# Patient Record
Sex: MALE | Race: WHITE | NOT HISPANIC OR LATINO | URBAN - METROPOLITAN AREA
[De-identification: names, ages, dates, MRNs, and addresses within clinical notes are randomized per-mention and may not be internally consistent; named-entity substitution may affect disease eponyms.]

---

## 2017-09-02 ENCOUNTER — HOSPITAL ENCOUNTER (OUTPATIENT)
Dept: CT IMAGING | Facility: CLINIC | Age: 57
Discharge: HOME | End: 2017-09-02

## 2017-09-02 DIAGNOSIS — R10.9 LEFT SIDED ABDOMINAL PAIN: ICD-10-CM

## 2017-09-05 ENCOUNTER — COMMUNICATION - HEALTHEAST (OUTPATIENT)
Dept: UROLOGY | Facility: CLINIC | Age: 57
End: 2017-09-05

## 2017-09-11 ENCOUNTER — COMMUNICATION - HEALTHEAST (OUTPATIENT)
Dept: NEUROSURGERY | Facility: CLINIC | Age: 57
End: 2017-09-11

## 2017-09-14 ENCOUNTER — RECORDS - HEALTHEAST (OUTPATIENT)
Dept: ADMINISTRATIVE | Facility: OTHER | Age: 57
End: 2017-09-14

## 2017-09-15 ENCOUNTER — OFFICE VISIT - HEALTHEAST (OUTPATIENT)
Dept: NEUROSURGERY | Facility: CLINIC | Age: 57
End: 2017-09-15

## 2017-09-15 DIAGNOSIS — G95.20 CORD COMPRESSION MYELOPATHY (H): ICD-10-CM

## 2017-09-15 ASSESSMENT — MIFFLIN-ST. JEOR: SCORE: 1722.98

## 2017-09-18 ENCOUNTER — COMMUNICATION - HEALTHEAST (OUTPATIENT)
Dept: NEUROSURGERY | Facility: CLINIC | Age: 57
End: 2017-09-18

## 2017-09-20 ENCOUNTER — RECORDS - HEALTHEAST (OUTPATIENT)
Dept: ADMINISTRATIVE | Facility: OTHER | Age: 57
End: 2017-09-20

## 2017-12-08 ENCOUNTER — COMMUNICATION - HEALTHEAST (OUTPATIENT)
Dept: NEUROSURGERY | Facility: CLINIC | Age: 57
End: 2017-12-08

## 2017-12-12 ENCOUNTER — RECORDS - HEALTHEAST (OUTPATIENT)
Dept: ADMINISTRATIVE | Facility: OTHER | Age: 57
End: 2017-12-12

## 2018-01-16 ENCOUNTER — RECORDS - HEALTHEAST (OUTPATIENT)
Dept: ADMINISTRATIVE | Facility: OTHER | Age: 58
End: 2018-01-16

## 2018-01-17 ENCOUNTER — OFFICE VISIT - HEALTHEAST (OUTPATIENT)
Dept: NEUROSURGERY | Facility: CLINIC | Age: 58
End: 2018-01-17

## 2018-01-17 DIAGNOSIS — G95.20 CORD COMPRESSION (H): ICD-10-CM

## 2018-01-22 ENCOUNTER — ANESTHESIA - HEALTHEAST (OUTPATIENT)
Dept: SURGERY | Facility: CLINIC | Age: 58
End: 2018-01-22

## 2018-01-22 ENCOUNTER — SURGERY - HEALTHEAST (OUTPATIENT)
Dept: SURGERY | Facility: CLINIC | Age: 58
End: 2018-01-22

## 2018-01-22 ASSESSMENT — MIFFLIN-ST. JEOR: SCORE: 1750.2

## 2018-01-24 ENCOUNTER — COMMUNICATION - HEALTHEAST (OUTPATIENT)
Dept: NEUROSURGERY | Facility: CLINIC | Age: 58
End: 2018-01-24

## 2018-01-24 ENCOUNTER — AMBULATORY - HEALTHEAST (OUTPATIENT)
Dept: OTHER | Facility: CLINIC | Age: 58
End: 2018-01-24

## 2018-01-24 DIAGNOSIS — G95.20 CORD COMPRESSION MYELOPATHY (H): ICD-10-CM

## 2018-02-07 ENCOUNTER — COMMUNICATION - HEALTHEAST (OUTPATIENT)
Dept: NEUROSURGERY | Facility: CLINIC | Age: 58
End: 2018-02-07

## 2018-02-07 ENCOUNTER — AMBULATORY - HEALTHEAST (OUTPATIENT)
Dept: NEUROSURGERY | Facility: CLINIC | Age: 58
End: 2018-02-07

## 2018-02-15 ENCOUNTER — COMMUNICATION - HEALTHEAST (OUTPATIENT)
Dept: NEUROSURGERY | Facility: CLINIC | Age: 58
End: 2018-02-15

## 2018-02-15 DIAGNOSIS — M54.2 NECK PAIN: ICD-10-CM

## 2018-02-23 ENCOUNTER — OFFICE VISIT - HEALTHEAST (OUTPATIENT)
Dept: NEUROSURGERY | Facility: CLINIC | Age: 58
End: 2018-02-23

## 2018-02-23 ENCOUNTER — HOSPITAL ENCOUNTER (OUTPATIENT)
Dept: RADIOLOGY | Facility: CLINIC | Age: 58
Discharge: HOME | End: 2018-02-23
Attending: NEUROLOGICAL SURGERY

## 2018-02-23 ENCOUNTER — COMMUNICATION - HEALTHEAST (OUTPATIENT)
Dept: NEUROSURGERY | Facility: CLINIC | Age: 58
End: 2018-02-23

## 2018-02-23 DIAGNOSIS — G95.20 CORD COMPRESSION MYELOPATHY (H): ICD-10-CM

## 2018-02-23 DIAGNOSIS — M54.2 NECK PAIN: ICD-10-CM

## 2018-02-23 DIAGNOSIS — Z09 FOLLOW UP: ICD-10-CM

## 2018-02-28 ENCOUNTER — COMMUNICATION - HEALTHEAST (OUTPATIENT)
Dept: NEUROSURGERY | Facility: CLINIC | Age: 58
End: 2018-02-28

## 2018-02-28 DIAGNOSIS — M54.2 NECK PAIN: ICD-10-CM

## 2018-03-07 ENCOUNTER — COMMUNICATION - HEALTHEAST (OUTPATIENT)
Dept: NEUROSURGERY | Facility: CLINIC | Age: 58
End: 2018-03-07

## 2019-04-30 ENCOUNTER — RECORDS - HEALTHEAST (OUTPATIENT)
Dept: LAB | Facility: CLINIC | Age: 59
End: 2019-04-30

## 2019-04-30 LAB
ALBUMIN SERPL-MCNC: 3.9 G/DL (ref 3.5–5)
ALP SERPL-CCNC: 62 U/L (ref 45–120)
ALT SERPL W P-5'-P-CCNC: 30 U/L (ref 0–45)
ANION GAP SERPL CALCULATED.3IONS-SCNC: 12 MMOL/L (ref 5–18)
AST SERPL W P-5'-P-CCNC: 14 U/L (ref 0–40)
BILIRUB SERPL-MCNC: 0.4 MG/DL (ref 0–1)
BUN SERPL-MCNC: 18 MG/DL (ref 8–22)
CALCIUM SERPL-MCNC: 9.4 MG/DL (ref 8.5–10.5)
CHLORIDE BLD-SCNC: 109 MMOL/L (ref 98–107)
CHOLEST SERPL-MCNC: 156 MG/DL
CO2 SERPL-SCNC: 20 MMOL/L (ref 22–31)
CREAT SERPL-MCNC: 0.93 MG/DL (ref 0.7–1.3)
FASTING STATUS PATIENT QL REPORTED: ABNORMAL
GFR SERPL CREATININE-BSD FRML MDRD: >60 ML/MIN/1.73M2
GLUCOSE BLD-MCNC: 127 MG/DL (ref 70–125)
HDLC SERPL-MCNC: 52 MG/DL
LDLC SERPL CALC-MCNC: 73 MG/DL
POTASSIUM BLD-SCNC: 3.9 MMOL/L (ref 3.5–5)
PROT SERPL-MCNC: 6.6 G/DL (ref 6–8)
PSA SERPL-MCNC: 1.6 NG/ML (ref 0–3.5)
SODIUM SERPL-SCNC: 141 MMOL/L (ref 136–145)
TRIGL SERPL-MCNC: 153 MG/DL

## 2021-05-31 VITALS — HEIGHT: 70 IN | WEIGHT: 201 LBS | BODY MASS INDEX: 28.77 KG/M2

## 2021-05-31 VITALS — HEIGHT: 70 IN | WEIGHT: 207 LBS | BODY MASS INDEX: 29.63 KG/M2

## 2021-06-13 NOTE — PROGRESS NOTES
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  This would be from C3 through C7.  This would address his short pedicles and also his cord problem at C6-7.  He has myelomalacia at C6-7 where he has a rather large herniated disc.  He also has a herniated disc at C5-6.  This narrows the spinal canal, as well.  It is my opinion that both levels were involved with his cervical, work-related injury.  He is too young to have a 2 level fusion.  If he were to have a 2 level fusion, he would be back within 5 years for an adjacent level problem.  I explained this very carefully to him.  I believe this to be the best approach to his work-related injury and decompression of his spinal cord.  I discussed the risks and benefits of this approach.  He asked appropriate questions and I believe I answered to his satisfaction.  I will go ahead and submit the orders.  Total time spent in this personal interpretation of imaging, review of information, documentation, coordination of care and face-to-face discussion was 45 minutes.    Andi Feng   [id number] [facility] [id number]  57 y.o. male is sent to me in consultation   by Rommel Kern MD     CC:    Chief Complaint   Patient presents with     Neck Pain       HPI:  [unfilled]      PROBLEM LIST:  Active Problems:    * No active hospital problems. *       REVIEW OF SYSTEMS:  A 12 point review of systems is present in the chart and reviewed.  The neurological symptoms are listed above.  Otherwise, the review is negative.      Past Medical History:   Diagnosis Date     Asthma      Neck pain          Past Surgical History:   Procedure Laterality Date     no past surgery           MEDICATIONS:  Current Outpatient Prescriptions   Medication Sig Dispense Refill     oxyCODONE (ROXICODONE) 5 MG immediate release tablet Take 1 tablet (5 mg total) by mouth every 4 (four) hours as needed  "for pain. 20 tablet 0     No current facility-administered medications for this visit.          ALLERGIES/SENSITIVITIES:     Allergies   Allergen Reactions     Penicillins        PERTINENT SOCIAL HISTORY:   Social History     Social History     Marital status:      Spouse name: N/A     Number of children: N/A     Years of education: N/A     Social History Main Topics     Smoking status: Former Smoker     Smokeless tobacco: Current User     Alcohol use Yes      Comment: Occasional     Drug use: No     Sexual activity: Not Asked     Other Topics Concern     None     Social History Narrative         FAMILY HISTORY:  Family History   Problem Relation Age of Onset     No Medical Problems Mother      No Medical Problems Father      No Medical Problems Sister      No Medical Problems Brother      No Medical Problems Maternal Aunt      No Medical Problems Maternal Uncle      No Medical Problems Paternal Aunt      No Medical Problems Paternal Uncle      No Medical Problems Maternal Grandmother      No Medical Problems Maternal Grandfather      No Medical Problems Paternal Grandmother      No Medical Problems Paternal Grandfather         PHYSICAL EXAM:   Constitutional: /77  Pulse 70  Ht 5' 10\" (1.778 m)  Wt 201 lb (91.2 kg)  SpO2 97%  BMI 28.84 kg/m2    General appearance: Appropriately groomed.  No acute distress.  Interactive.     Mental Status: Mental status: Alert and oriented, mood and affect appropriate, language reception and expression normal, recent and remote memory is normal, higher cortical function normal. Attention span, concentration and ability to follow commands is normal.       Cranial Nerves: Face is symmetric.  Extraocular movements are full, conjugate and without nystagmus.  Hearing is preserved.  Shoulder position is symmetric.  Tongue is midline with normal motion.  Palate elevates symmetrically.     Motor: Motor exam nl bilateral UE. Tone nl, bulk nl and strength 5/5 all groups.  "     Sensory: Sensory exam by subjective report intact to LT,PP,Position and Vib. in the UE and  LE.     Station and Gait:  Station and Gait- nl stride length,  balance and temi..     Reflexes; supinator, biceps, triceps, knee/ ankle jerk intact. No hoffmans/babinski/ clonus.    IMAGING:  I have personally reviewed the images and discussed the findings with Andi Feng.  He has short pedicles and the combination of this and disc disease at C5-6 and C6-7 produces a critical stenosis of his cervical spine.  He has myelomalacia at C6-7.  He has by foraminal stenosis bilateral at C4-5, C5-6 and C6-7.       CC:     Rommel Kern [id number] [place] [address]

## 2021-06-13 NOTE — PROGRESS NOTES
Neurosurgery consultation was requested by: Rommel Kern  Pain: Neck pain   Radicular Pain is present:Radiates into right shoulder and down right lateral side of arm   Lhermitte sign: No  Motor complaints: Weakness in right arm  Sensory complaints: Numbness in right arm and hand  Gait and balance issues: Denies   Bowel or bladder issues: Denies  Duration of SX is: 8/15/17  The symptoms are worse with: Sitting and driving   The symptoms are better with:Nothing  Injury: W/C, pulling a pallet off truck and it was raining and slipped and fell on right shoulder  Severity is: Mild - moderate  Patient has tried the following conservative measures: None   NDI score is : 40%  IRIS Lewis

## 2021-06-15 NOTE — ANESTHESIA CARE TRANSFER NOTE
Last vitals:   Vitals:    01/22/18 1558   BP: 108/62   Pulse: 60   Resp: 16   Temp: 36.6  C (97.9  F)   SpO2: 98%     Patient's level of consciousness is drowsy  Spontaneous respirations: yes  Maintains airway independently: yes  Dentition unchanged: yes  Oropharynx: oropharynx clear of all foreign objects    QCDR Measures:  ASA# 20 - Surgical Safety Checklist: WHO surgical safety checklist completed prior to induction  PQRS# 430 - Adult PONV Prevention: 4558F - Pt received => 2 anti-emetic agents (different classes) preop & intraop  ASA# 8 - Peds PONV Prevention: NA - Not pediatric patient, not GA or 2 or more risk factors NOT present  PQRS# 424 - Chelsy-op Temp Management: 4559F - At least one body temp DOCUMENTED => 35.5C or 95.9F within required timeframe  PQRS# 426 - PACU Transfer Protocol: - Transfer of care checklist used  ASA# 14 - Acute Post-op Pain: ASA14B - Patient did NOT experience pain >= 7 out of 10

## 2021-06-15 NOTE — PROGRESS NOTES
"Andi Feng is status post open door laminoplasty C4-C7, right; cervical foraminotomies at C4-5, C5-6 and C6-7, right on 1/22/2018 by Dr. Shobha Trent.  Preoperatively presented with complaints of progressive myelopathy.  Today he is here with his SO for staples removal. He is very pleased with his outcome - his neck pain is minimal, he denies radicular pain, sensory or motor disturbance in all founr extremity. Takes Valium prn for \"stifness in both shoulders\". Gait and balance are normal. Wears a [place] J collar.      Surgical wound WNL - CDI, no signs of infection or skin breakdown.  Incision well-healed: good skin approximation, no redness or visible/palpable edema, no tenderness to palpation.  PT. AF, denies fever, chills or sweats.  Pt. reports that the symptoms are improved from pre-op.    Staples - intact removed without difficulty. Wound prepped with Betadine before and after removal.  Surrounding skin has no signs of breakdown.  Verbal instructions regarding incision care are given.  Pt. advised to call us if any s/s of infection noted - all discussed in details.      "

## 2021-06-15 NOTE — PROGRESS NOTES
S: I saw patient at [facility] for an assessment of Edna J collar fit.  O/g: Patient c/o collar being too tall.  A correctly fitting collar will help to ensure outcome and complaince.  A: I assessed the fit and it was too tall.  I exchanged the collar (500) for a shorter (300) collar.  His wife was in the room.  I reiterated don, doff and care.  Patient stated it felt much better.  P: Patient will contact our office PRN.    LIDYA Grace, COLLINS

## 2021-06-15 NOTE — ANESTHESIA POSTPROCEDURE EVALUATION
Patient: Andi Feng  C4-C7 OPEN RIGHT LAMINOPLASTY  Anesthesia type: general    Patient location: PACU  Last vitals:   Vitals:    01/22/18 1630   BP: 112/56   Pulse: 64   Resp: 16   Temp:    SpO2: 99%     Post vital signs: stable  Level of consciousness: awake and responds to simple questions  Post-anesthesia pain: pain needs to be addressed; continue IV narcotcs  Post-anesthesia nausea and vomiting: no  Pulmonary: unassisted, nasal cannula  Cardiovascular: stable and blood pressure at baseline  Hydration: adequate  Anesthetic events: no    QCDR Measures:  ASA# 11 - Chelsy-op Cardiac Arrest: ASA11B - Patient did NOT experience unanticipated cardiac arrest  ASA# 12 - Chelsy-op Mortality Rate: ASA12B - Patient did NOT die  ASA# 13 - PACU Re-Intubation Rate: ASA13B - Patient did NOT require a new airway mgmt  ASA# 10 - Composite Anes Safety: ASA10A - No serious adverse event    Additional Notes:

## 2021-06-15 NOTE — PROGRESS NOTES
Preop Assessment: Andi Feng presents for pre-op review.  Surgeon: Dr. Shobha Trent  Name of Surgery: cervical C3-C7 open right laminoplasty  Diagnosis: cord compression  Date of Surgery: 01/22/2018  Time of Surgery: 0830  Hospital: [facility]  H&P: by AUNDREA Peoples on 1/15/2018 - cleared for surgery  History of ASA, NSAIDS, vitamin and/or herbal supplements within 10 days: Yes - NSAIDS  History of blood thinners: No  History of anti-seizure med's: No  Review of systems: unchanged    Diagnostics:  Labs: WNL  CXR: n/a  EKG: n/a  Other: Isac FUENTES collar to OR  Films: MRI from 9/7/2017 - cleared for surgery      Last BM - this am  Nausea or Vomiting - denies  Urinary retention - denies  Pain management - no Red Flags  Home PT Evaluation: ambulates independently, steady gait and stride, no imbalance noted  - no indication for Home PT pre-op  Patient confirmed they have help/assistance in place at home upon discharge      All questions answered regarding surgery and expected pre and postoperative course including rehabilitation phase.     Reviewed with patient: Arrive 2.5 -3 hours prior to scheduled surgery, nothing to eat or drink after midnight the night before surgery and bring all pertinent films to the hospital the day of surgery.  Continue to refrain from NSAIDS (Ibuprofen, Aleve, Naprosyn) ASA or over the counter herbal medication or supplements, anticoagulants and blood thinners.    Preop skin preparations and instructions provided.    Patient confirmed they have help/assistance in place at home upon discharge      Mary Crouch RN, CNRN

## 2021-06-15 NOTE — ANESTHESIA PREPROCEDURE EVALUATION
Anesthesia Evaluation      Patient summary reviewed   No history of anesthetic complications     Airway   Mallampati: II  Neck ROM: full   Pulmonary - normal exam   (+) asthma  mild,  well controlled,                          Cardiovascular - negative ROS and normal exam   Neuro/Psych    (+) chronic pain    Endo/Other - negative ROS      GI/Hepatic/Renal - negative ROS           Dental    (+) upper dentures                       Anesthesia Plan  Planned anesthetic: general endotracheal  50 mg ketamine IV on induction.    ASA 2   Induction: intravenous   Anesthetic plan and risks discussed with: patient  Anesthesia plan special considerations: video-assisted,   Post-op plan: routine recovery

## 2021-06-16 PROBLEM — G95.20 CORD COMPRESSION MYELOPATHY (H): Status: ACTIVE | Noted: 2018-01-22

## 2021-06-16 NOTE — PROGRESS NOTES
CHART NOTE     1960     DATE OF SERVICE: 2/23/2018     FOLLOW UP EVALUATION:      ASSESSMENT AND PLAN:Andi Feng is status post C 3-7 laminoplasty by Dr Trent on 1/22/18.  Pre op w sx of myelopathy. He's doing well--imaging looks good.  He has decreased ROM as anticipated after wearing collar.  He reports left shoulder pain, there is no pain w ROM and he has full ROM.  There is point tenderness of the shoulder.  He reports neck fatigue across the day,   He was given referral to Dr Rodgers to start therapy.  If his shoulder symptoms do not improve w therapy, then MRI and referral to Ortho. He will remain off work x 2 weeks within that 2 weeks I anticipate he will wean from collar and est care w Dr Rodgers.  Ongoing work recommendations per Dr Rodgers.   I provided Elavil 25 mg po to be taken at bedtime for noc pain.     FOLLOW UP: Patient to follow up with as directed by Dr Rodgers      Today, Andi Feng reports overall doing well.  Reports left shoulder pain, which is worse at night.  He denies injury.  He denies weakness or numbness and tingling.  By the end of the day his neck feels fatigued.  Neck achy at night disrupting sleep.      PAST MEDICAL HISTORY, SURGICAL HISTORY, REVIEW OF SYMPTOMS, MEDICATIONS AND ALLERGIES:  Past medical history, surgical history, review of symptoms, medications and allergies remain unchanged.    Vitals:    02/23/18 1329   BP: 130/80   Pulse: (!) 55   Resp: 16   Temp: 98  F (36.7  C)   SpO2: 98%        PHYSICAL EXAM:  Neurological exam reveals:  Alert and oriented x3, speech fluent and appropriate.   PERRL, EOMI, face symmetric, tongue midline, shoulder shrug equal  No pronator drift, finger to nose smooth and accurate bilaterally  Arm strength bilateral grasp, biceps, triceps, and deltoids 5/5   Leg strength bilateral dorsiflexion, plantar flexion, and hip flexion 5/5  Muscle Bulk and tone wnl.   Reflexes:No pathological reflexes   Gait and  station:Normal  Incision:healing well   YENNY: 52%     RADIOGRAPHIC IMAGING: I personally reviewed any new diagnostic studies. Good lignment and position of hardware

## 2021-07-03 NOTE — ADDENDUM NOTE
Addendum Note by Lamont Crouch RN at 3/13/2018  7:37 AM     Author: Lamont Crouch RN Service: -- Author Type: Registered Nurse    Filed: 3/13/2018  7:37 AM Encounter Date: 2/23/2018 Status: Signed    : Lamont Crouch RN (Registered Nurse)    Addended by: LAMONT CROUCH on: 3/13/2018 07:37 AM        Modules accepted: Orders